# Patient Record
Sex: MALE | Race: WHITE | NOT HISPANIC OR LATINO | Employment: OTHER | URBAN - METROPOLITAN AREA
[De-identification: names, ages, dates, MRNs, and addresses within clinical notes are randomized per-mention and may not be internally consistent; named-entity substitution may affect disease eponyms.]

---

## 2018-01-05 ENCOUNTER — TRANSCRIBE ORDERS (OUTPATIENT)
Dept: ADMINISTRATIVE | Facility: HOSPITAL | Age: 78
End: 2018-01-05

## 2018-01-05 ENCOUNTER — APPOINTMENT (OUTPATIENT)
Dept: RADIOLOGY | Facility: MEDICAL CENTER | Age: 78
End: 2018-01-05
Payer: MEDICARE

## 2018-01-05 ENCOUNTER — GENERIC CONVERSION - ENCOUNTER (OUTPATIENT)
Dept: OTHER | Facility: OTHER | Age: 78
End: 2018-01-05

## 2018-01-05 DIAGNOSIS — S93.401A SPRAIN OF RIGHT ANKLE, UNSPECIFIED LIGAMENT, INITIAL ENCOUNTER: Primary | ICD-10-CM

## 2018-01-05 PROCEDURE — 73600 X-RAY EXAM OF ANKLE: CPT

## 2019-12-23 ENCOUNTER — TRANSCRIBE ORDERS (OUTPATIENT)
Dept: LAB | Facility: CLINIC | Age: 79
End: 2019-12-23

## 2022-03-31 PROBLEM — N18.9 ACUTE KIDNEY INJURY SUPERIMPOSED ON CHRONIC KIDNEY DISEASE (HCC): Status: ACTIVE | Noted: 2021-08-01

## 2022-03-31 PROBLEM — K22.10 ULCERATIVE ESOPHAGITIS: Status: ACTIVE | Noted: 2021-08-11

## 2022-03-31 PROBLEM — Z98.890 HISTORY OF BLADDER SURGERY: Status: ACTIVE | Noted: 2021-08-01

## 2022-03-31 PROBLEM — I63.9 ISCHEMIC STROKE (HCC): Status: ACTIVE | Noted: 2021-11-10

## 2022-03-31 PROBLEM — E78.5 DYSLIPIDEMIA: Status: ACTIVE | Noted: 2021-08-01

## 2022-03-31 PROBLEM — I50.32 CHRONIC DIASTOLIC HEART FAILURE (HCC): Status: ACTIVE | Noted: 2018-08-14

## 2022-03-31 PROBLEM — D68.9 COAGULATION DEFECT, UNSPECIFIED (HCC): Status: ACTIVE | Noted: 2021-12-09

## 2022-03-31 PROBLEM — G60.9 IDIOPATHIC PERIPHERAL NEUROPATHY: Status: ACTIVE | Noted: 2017-06-05

## 2022-03-31 PROBLEM — N17.9 ACUTE KIDNEY INJURY SUPERIMPOSED ON CHRONIC KIDNEY DISEASE (HCC): Status: ACTIVE | Noted: 2021-08-01

## 2022-03-31 PROBLEM — K50.919 CROHN'S DISEASE WITH COMPLICATION (HCC): Status: ACTIVE | Noted: 2021-08-01

## 2022-03-31 PROBLEM — Z11.1 ENCOUNTER FOR SCREENING FOR RESPIRATORY TUBERCULOSIS: Status: ACTIVE | Noted: 2021-12-24

## 2022-03-31 PROBLEM — I48.0 PAROXYSMAL ATRIAL FIBRILLATION (HCC): Status: ACTIVE | Noted: 2017-06-09

## 2022-03-31 PROBLEM — G46.4 CEREBELLAR STROKE SYNDROME: Status: ACTIVE | Noted: 2021-12-16

## 2022-03-31 PROBLEM — K26.9 DUODENAL ULCER: Status: ACTIVE | Noted: 2021-08-16

## 2022-03-31 PROBLEM — K92.2 UPPER GI BLEED: Status: ACTIVE | Noted: 2021-08-16

## 2022-03-31 PROBLEM — Z98.61 S/P PTCA (PERCUTANEOUS TRANSLUMINAL CORONARY ANGIOPLASTY): Status: ACTIVE | Noted: 2017-06-22

## 2022-03-31 PROBLEM — E03.9 HYPOTHYROIDISM, UNSPECIFIED: Status: ACTIVE | Noted: 2022-01-26

## 2022-03-31 PROBLEM — H34.232 RETINAL ARTERY BRANCH OCCLUSION OF LEFT EYE: Status: ACTIVE | Noted: 2017-07-28

## 2022-03-31 PROBLEM — I50.9 HEART FAILURE, UNSPECIFIED (HCC): Status: ACTIVE | Noted: 2021-12-16

## 2022-03-31 PROBLEM — Z98.61 H/O PERCUTANEOUS TRANSLUMINAL CORONARY ANGIOPLASTY: Status: ACTIVE | Noted: 2021-08-01

## 2022-03-31 PROBLEM — N18.6 ESRD (END STAGE RENAL DISEASE) (HCC): Status: ACTIVE | Noted: 2021-11-10

## 2022-03-31 PROBLEM — I24.9 ACS (ACUTE CORONARY SYNDROME) (HCC): Status: ACTIVE | Noted: 2019-01-06

## 2022-03-31 PROBLEM — Z95.5 S/P CORONARY ARTERY STENT PLACEMENT: Status: ACTIVE | Noted: 2021-08-01

## 2022-03-31 PROBLEM — Z87.19 H/O: GI BLEED: Status: ACTIVE | Noted: 2021-11-10

## 2022-03-31 PROBLEM — N28.1 ACQUIRED RENAL CYST: Status: ACTIVE | Noted: 2017-01-18

## 2022-03-31 PROBLEM — D64.9 PROFOUND ANEMIA: Status: ACTIVE | Noted: 2021-08-01

## 2022-03-31 PROBLEM — K92.2 ACUTE UPPER GI BLEED: Status: ACTIVE | Noted: 2021-09-21

## 2022-04-08 ENCOUNTER — TELEPHONE (OUTPATIENT)
Dept: NEPHROLOGY | Facility: CLINIC | Age: 82
End: 2022-04-08

## 2022-04-08 NOTE — TELEPHONE ENCOUNTER
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

## 2022-04-28 ENCOUNTER — OFFICE VISIT (OUTPATIENT)
Dept: FAMILY MEDICINE CLINIC | Facility: CLINIC | Age: 82
End: 2022-04-28
Payer: MEDICARE

## 2022-04-28 ENCOUNTER — TELEPHONE (OUTPATIENT)
Dept: NEPHROLOGY | Facility: CLINIC | Age: 82
End: 2022-04-28

## 2022-04-28 VITALS
BODY MASS INDEX: 24.2 KG/M2 | HEIGHT: 70 IN | OXYGEN SATURATION: 100 % | HEART RATE: 64 BPM | WEIGHT: 169 LBS | DIASTOLIC BLOOD PRESSURE: 62 MMHG | TEMPERATURE: 98.7 F | SYSTOLIC BLOOD PRESSURE: 112 MMHG

## 2022-04-28 DIAGNOSIS — Z00.00 MEDICARE ANNUAL WELLNESS VISIT, SUBSEQUENT: ICD-10-CM

## 2022-04-28 DIAGNOSIS — I24.9 ACS (ACUTE CORONARY SYNDROME) (HCC): ICD-10-CM

## 2022-04-28 DIAGNOSIS — I50.32 CHRONIC DIASTOLIC HEART FAILURE (HCC): ICD-10-CM

## 2022-04-28 DIAGNOSIS — E44.1 MILD PROTEIN-CALORIE MALNUTRITION (HCC): ICD-10-CM

## 2022-04-28 DIAGNOSIS — E03.9 HYPOTHYROIDISM, UNSPECIFIED TYPE: ICD-10-CM

## 2022-04-28 DIAGNOSIS — E11.22 TYPE 2 DIABETES MELLITUS WITH CHRONIC KIDNEY DISEASE ON CHRONIC DIALYSIS, WITHOUT LONG-TERM CURRENT USE OF INSULIN (HCC): ICD-10-CM

## 2022-04-28 DIAGNOSIS — I10 BENIGN ESSENTIAL HYPERTENSION: Primary | ICD-10-CM

## 2022-04-28 DIAGNOSIS — I48.0 PAROXYSMAL ATRIAL FIBRILLATION (HCC): ICD-10-CM

## 2022-04-28 DIAGNOSIS — D68.9 COAGULATION DEFECT, UNSPECIFIED (HCC): ICD-10-CM

## 2022-04-28 DIAGNOSIS — N18.6 TYPE 2 DIABETES MELLITUS WITH CHRONIC KIDNEY DISEASE ON CHRONIC DIALYSIS, WITHOUT LONG-TERM CURRENT USE OF INSULIN (HCC): ICD-10-CM

## 2022-04-28 DIAGNOSIS — I63.9 ISCHEMIC STROKE (HCC): ICD-10-CM

## 2022-04-28 DIAGNOSIS — I50.32 CHRONIC DIASTOLIC CHF (CONGESTIVE HEART FAILURE) (HCC): ICD-10-CM

## 2022-04-28 DIAGNOSIS — N18.6 ESRD (END STAGE RENAL DISEASE) (HCC): ICD-10-CM

## 2022-04-28 DIAGNOSIS — Z99.2 TYPE 2 DIABETES MELLITUS WITH CHRONIC KIDNEY DISEASE ON CHRONIC DIALYSIS, WITHOUT LONG-TERM CURRENT USE OF INSULIN (HCC): ICD-10-CM

## 2022-04-28 DIAGNOSIS — K51.919 ULCERATIVE COLITIS WITH COMPLICATION, UNSPECIFIED LOCATION (HCC): ICD-10-CM

## 2022-04-28 PROCEDURE — G0438 PPPS, INITIAL VISIT: HCPCS | Performed by: FAMILY MEDICINE

## 2022-04-28 PROCEDURE — 1123F ACP DISCUSS/DSCN MKR DOCD: CPT | Performed by: FAMILY MEDICINE

## 2022-04-28 PROCEDURE — 99204 OFFICE O/P NEW MOD 45 MIN: CPT | Performed by: FAMILY MEDICINE

## 2022-04-28 RX ORDER — FUROSEMIDE 20 MG/1
20 TABLET ORAL 2 TIMES DAILY
Status: CANCELLED | OUTPATIENT
Start: 2022-04-28

## 2022-04-28 RX ORDER — AMLODIPINE BESYLATE 5 MG/1
10 TABLET ORAL DAILY
Qty: 30 TABLET | Refills: 5 | Status: SHIPPED | OUTPATIENT
Start: 2022-04-28

## 2022-04-28 RX ORDER — ROSUVASTATIN CALCIUM 10 MG/1
TABLET, COATED ORAL
Qty: 30 TABLET | Refills: 5 | Status: SHIPPED | OUTPATIENT
Start: 2022-04-28

## 2022-04-28 RX ORDER — HYDRALAZINE HYDROCHLORIDE 25 MG/1
TABLET, FILM COATED ORAL
COMMUNITY
Start: 2022-04-25

## 2022-04-28 RX ORDER — TORSEMIDE 20 MG/1
1 TABLET ORAL DAILY
COMMUNITY
Start: 2021-12-10 | End: 2022-04-28 | Stop reason: ALTCHOICE

## 2022-04-28 RX ORDER — FUROSEMIDE 20 MG/1
20 TABLET ORAL 2 TIMES DAILY
Qty: 60 TABLET | Refills: 3 | Status: SHIPPED | OUTPATIENT
Start: 2022-04-28 | End: 2022-07-25

## 2022-04-28 RX ORDER — FUROSEMIDE 20 MG/1
20 TABLET ORAL 2 TIMES DAILY
COMMUNITY
End: 2022-04-28 | Stop reason: SDUPTHER

## 2022-04-28 NOTE — ASSESSMENT & PLAN NOTE
Wt Readings from Last 3 Encounters:   04/28/22 76 7 kg (169 lb)   03/31/22 78 kg (172 lb)       History of CHF patient is euvolemic at this point going to dialysis 3 times per week at school questionable as to the degree of need for his current medications and treatment I will help the patient with his son today seeking 2nd opinions as he is reluctant to continue dialysis and at this point patient should follow-up with Cardiology for his heart as scheduled

## 2022-04-28 NOTE — PROGRESS NOTES
Assessment/Plan:       Problem List Items Addressed This Visit        Endocrine    Hypothyroidism, unspecified     Hypothyroidism stable check TSH T4 unclear as to degree of hypothyroidism patient is not on any medication for this currently         Relevant Orders    TSH, 3rd generation with Free T4 reflex    Type 2 diabetes mellitus with chronic kidney disease on chronic dialysis, without long-term current use of insulin (HCC)       Lab Results   Component Value Date    HGBA1C 5 0 11/11/2021   Unclear as patient is a new patient to me as to whether he truly had diabetes his last A1c was at 5 I do not believe he has diabetes at this time no treatment or medication now I will follow-up with laboratory work including A1c           Relevant Medications    furosemide (LASIX) 20 mg tablet    Other Relevant Orders    Hemoglobin A1C       Cardiovascular and Mediastinum    ACS (acute coronary syndrome) (HCC)    Relevant Medications    amLODIPine (NORVASC) 5 mg tablet    rosuvastatin (CRESTOR) 10 MG tablet    Other Relevant Orders    Ambulatory Referral to Nephrology    Comprehensive metabolic panel    CBC and differential    UA (URINE) with reflex to Scope    Microalbumin / creatinine urine ratio    Lipid panel    Benign essential hypertension - Primary     Stable currently no change in medications         Relevant Medications    hydrALAZINE (APRESOLINE) 25 mg tablet    amLODIPine (NORVASC) 5 mg tablet    rosuvastatin (CRESTOR) 10 MG tablet    furosemide (LASIX) 20 mg tablet    Other Relevant Orders    Ambulatory Referral to Nephrology    Comprehensive metabolic panel    CBC and differential    UA (URINE) with reflex to Scope    Microalbumin / creatinine urine ratio    Lipid panel    Chronic diastolic heart failure (HCC)     Wt Readings from Last 3 Encounters:   04/28/22 76 7 kg (169 lb)   03/31/22 78 kg (172 lb)       History of CHF patient is euvolemic at this point going to dialysis 3 times per week at school questionable as to the degree of need for his current medications and treatment I will help the patient with his son today seeking 2nd opinions as he is reluctant to continue dialysis and at this point patient should follow-up with Cardiology for his heart as scheduled           Relevant Medications    amLODIPine (NORVASC) 5 mg tablet    Ischemic stroke (HCC)    Relevant Medications    amLODIPine (NORVASC) 5 mg tablet    rosuvastatin (CRESTOR) 10 MG tablet    Other Relevant Orders    Ambulatory Referral to Nephrology    Comprehensive metabolic panel    CBC and differential    UA (URINE) with reflex to Scope    Microalbumin / creatinine urine ratio    Lipid panel    Paroxysmal atrial fibrillation (HCC)     Paroxysmal atrial fibrillation by history patient is in sinus mechanism at this time continuing on metoprolol and Eliquis         Relevant Medications    amLODIPine (NORVASC) 5 mg tablet       Hematopoietic and Hemostatic    Coagulation defect, unspecified ([facility] [address] )       Genitourinary    ESRD (end stage renal disease) ([facility] [address] )     Lab Results   Component Value Date    CREATININE 1 50 (H) 12/29/2014    CREATININE 1 30 12/02/2014    CREATININE 1 46 (H) 11/06/2014   I am unclear as to the degree of kidney disease on this patient has he presents here for the 1st time brought in by his son  I have been treating his son in the family for many years and this is the 1st meeting for me with this patient  He was hospitalized at [facility] from November through December and then started on dialysis directly however patient feels weak dizzy and tired and after dialysis treatments and states that he does not want to continue with dialysis and would like a 2nd opinion before having an AV shunt placed  The family states that the dialysis center tells them there is not too much dialyzed with treatments and the patient feels extremely weak for the next 24 hours after treatment    At this time would like him to go for a 2nd opinion to better clarify his medical situation         Relevant Medications    amLODIPine (NORVASC) 5 mg tablet    rosuvastatin (CRESTOR) 10 MG tablet    furosemide (LASIX) 20 mg tablet    Other Relevant Orders    Ambulatory Referral to Nephrology    Comprehensive metabolic panel    CBC and differential    UA (URINE) with reflex to Scope    Microalbumin / creatinine urine ratio    Lipid panel       Other    Mild protein-calorie malnutrition ([facility] [address] )    Medicare annual wellness visit, subsequent      Other Visit Diagnoses     Chronic diastolic CHF (congestive heart failure) (HCC)        Relevant Medications    amLODIPine (NORVASC) 5 mg tablet    furosemide (LASIX) 20 mg tablet    Ulcerative colitis with complication, unspecified location [facility])                Subjective:      Patient ID: Lynnette Sykes is a 79 y o  male  Patient presents to establish care in provide 2nd opinion on overall treatment he is reluctant to continue with dialysis although he is to be scheduled in the future for an AV shunt      The following portions of the patient's history were reviewed and updated as appropriate: allergies, current medications, past family history, past medical history, past social history, past surgical history and problem list     Review of Systems   Constitutional: Negative for chills, fatigue and fever  HENT: Negative for congestion, nosebleeds, rhinorrhea, sinus pressure and sore throat  Eyes: Negative for discharge and redness  Respiratory: Negative for cough and shortness of breath  Cardiovascular: Negative for chest pain, palpitations and leg swelling  Gastrointestinal: Negative for abdominal pain, blood in stool and nausea  Endocrine: Negative for cold intolerance, heat intolerance and polyuria  Genitourinary: Negative for dysuria and frequency          No difficulty urinating patient gets up twice per night and has good urinary flow overall   Musculoskeletal: Negative for arthralgias, back pain and myalgias  Skin: Negative for rash  Neurological: Negative for dizziness, weakness and headaches  Hematological: Negative for adenopathy  Psychiatric/Behavioral: Negative for behavioral problems and sleep disturbance  The patient is not nervous/anxious  Objective:      /62   Pulse 64   Temp 98 7 °F (37 1 °C)   Ht 5' 10" (1 778 m)   Wt 76 7 kg (169 lb)   SpO2 100%   BMI 24 25 kg/m²        Physical Exam  Vitals and nursing note reviewed  Constitutional:       Appearance: Normal appearance  He is well-developed and normal weight  HENT:      Head: Normocephalic and atraumatic  Right Ear: Tympanic membrane and external ear normal       Left Ear: Tympanic membrane and external ear normal       Nose: Nose normal       Mouth/Throat:      Mouth: Mucous membranes are moist       Pharynx: Oropharynx is clear  Eyes:      General: No scleral icterus  Extraocular Movements: Extraocular movements intact  Conjunctiva/sclera: Conjunctivae normal       Pupils: Pupils are equal, round, and reactive to light  Neck:      Thyroid: No thyromegaly  Vascular: No JVD  Cardiovascular:      Rate and Rhythm: Normal rate and regular rhythm  Pulses: Normal pulses  Heart sounds: Normal heart sounds  No murmur heard  Pulmonary:      Effort: Pulmonary effort is normal       Breath sounds: Normal breath sounds  No wheezing or rales  Chest:      Chest wall: No tenderness  Abdominal:      General: Bowel sounds are normal  There is no distension  Palpations: Abdomen is soft  There is no mass  Tenderness: There is no abdominal tenderness  There is no guarding or rebound  Musculoskeletal:         General: No tenderness or deformity  Normal range of motion  Cervical back: Normal range of motion and neck supple  Lymphadenopathy:      Cervical: No cervical adenopathy  Skin:     General: Skin is warm and dry  Findings: No erythema or rash  Neurological:      General: No focal deficit present  Mental Status: He is alert and oriented to person, place, and time  Cranial Nerves: No cranial nerve deficit  Deep Tendon Reflexes: Reflexes are normal and symmetric  Reflexes normal    Psychiatric:         Mood and Affect: Mood normal          Behavior: Behavior normal          Thought Content: Thought content normal          Judgment: Judgment normal           Data:    Laboratory Results: I have personally reviewed the pertinent laboratory results/reports   Radiology/Other Diagnostic Testing Results: I have personally reviewed pertinent reports         Lab Results   Component Value Date    HGB 13 3 05/02/2014    HCT 41 6 05/02/2014     Lab Results   Component Value Date     12/29/2014    K 4 9 12/29/2014     12/29/2014    CO2 29 12/29/2014    ANIONGAP 6 12/29/2014    BUN 32 (H) 12/29/2014    CREATININE 1 50 (H) 12/29/2014    GLUCOSE 124 12/29/2014    CALCIUM 9 6 12/29/2014     No results found for: CHOLESTEROL  No results found for: HDL  No results found for: LDLCALC  No results found for: TRIG  No results found for: CHOLHDL  No results found for: [id number], TSH  Lab Results   Component Value Date    HGBA1C 5 0 11/11/2021     No results found for: PSA    Jordyn Mcclure DO

## 2022-04-28 NOTE — TELEPHONE ENCOUNTER
Patients son Florentino Faulkner called the office advising that patients PCP referred him to our office to be seen by Lei Sands to be consulted for a fistula removal  No transplant has be done to proceed with such request  Patient is followed by our team at the [facility] [address] in [facility]  Can someone please call the son to discuss if the following is possible

## 2022-04-28 NOTE — ASSESSMENT & PLAN NOTE
Lab Results   Component Value Date    HGBA1C 5 0 11/11/2021   Unclear as patient is a new patient to me as to whether he truly had diabetes his last A1c was at 5 I do not believe he has diabetes at this time no treatment or medication now I will follow-up with laboratory work including A1c

## 2022-04-28 NOTE — ASSESSMENT & PLAN NOTE
Paroxysmal atrial fibrillation by history patient is in sinus mechanism at this time continuing on metoprolol and Eliquis

## 2022-04-28 NOTE — ASSESSMENT & PLAN NOTE
Hypothyroidism stable check TSH T4 unclear as to degree of hypothyroidism patient is not on any medication for this currently

## 2022-04-28 NOTE — TELEPHONE ENCOUNTER
Left voicemail explaining that currently patient has PermCath which needs to be present until patient has functioning AV graft which he is scheduled to be placed next month  Advised him to call back to dialysis center or office if any questions

## 2022-04-28 NOTE — PROGRESS NOTES
Assessment and Plan:     Problem List Items Addressed This Visit     None           Preventive health issues were discussed with patient, and age appropriate screening tests were ordered as noted in patient's After Visit Summary  Personalized health advice and appropriate referrals for health education or preventive services given if needed, as noted in patient's After Visit Summary       History of Present Illness:     Patient presents for Medicare Annual Wellness visit    Patient Care Team:  Bernardo Melchor DO as PCP - General (Family Medicine)     Problem List:     Patient Active Problem List   Diagnosis    ACS (acute coronary syndrome) ([facility] [address] [address] )    Acquired renal cyst    Acute kidney injury superimposed on chronic kidney disease [facility]    Benign essential hypertension    Benign prostatic hyperplasia without urinary obstruction    Cerebellar stroke syndrome    Chronic diastolic heart failure ([facility]    Coagulation defect, unspecified ([facility]    Mitral valve disorder    Crohn's disease with complication ([facility]    Duodenal ulcer    Dyslipidemia    Elevated PSA    Encounter for screening for respiratory tuberculosis    ESRD (end stage renal disease) ([facility] )    H/O percutaneous transluminal coronary angioplasty    H/O: GI bleed    Heart failure, unspecified ([facility] )    History of bladder surgery    Hypothyroidism, unspecified    Idiopathic peripheral neuropathy    Upper GI bleed    Ischemic stroke ([facility]    Occlusion and stenosis of carotid artery without mention of cerebral infarction    Osteoarthritis of pelvis    S/P PTCA (percutaneous transluminal coronary angioplasty)    Paroxysmal atrial fibrillation (HCC)    Peptic reflux disease    Profound anemia    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    S/P coronary artery stent placement    Spinal stenosis of lumbar region    Ulcerative esophagitis    Acute upper GI bleed      Past Medical and Surgical History:     No past medical history on file   No past surgical history on file  Family History:     No family history on file  Social History:     Social History     Socioeconomic History    Marital status: Unknown     Spouse name: Not on file    Number of children: Not on file    Years of education: Not on file    Highest education level: Not on file   Occupational History    Occupation: RETIRED/ [employer]   Tobacco Use    Smoking status: Never Smoker    Smokeless tobacco: Never Used   Substance and Sexual Activity    Alcohol use: Never    Drug use: Not on file    Sexual activity: Not on file   Other Topics Concern    Not on file   Social History Narrative    Not on file     Social Determinants of Health     Financial Resource Strain: Not on file   Food Insecurity: Not on file   Transportation Needs: Not on file   Physical Activity: Not on file   Stress: Not on file   Social Connections: Not on file   Intimate Partner Violence: Not on file   Housing Stability: Not on file      Medications and Allergies:     Current Outpatient Medications   Medication Sig Dispense Refill    amLODIPine (NORVASC) 5 mg tablet Take 10 mg by mouth      apixaban (Eliquis) 2 5 mg Take 1 tablet by mouth 2 (two) times a day      Cholecalciferol 50 MCG (2000 UT) CAPS Take 1 tablet by mouth daily      metoprolol succinate (TOPROL-XL) 200 MG 24 hr tablet Take 1 tablet by mouth daily      pantoprazole (PROTONIX) 40 mg tablet Take 40 mg by mouth daily      rosuvastatin (CRESTOR) 40 MG tablet Take 1 tablet by mouth in the morning       No current facility-administered medications for this visit       Allergies   Allergen Reactions    Contrast Dye [Iodinated Diagnostic Agents] Other (See Comments)     NOT SPECIFIED    Penicillins Other (See Comments)     NOT SPECIFIED      Immunizations:     Immunization History   Administered Date(s) Administered    Hepatitis B Vaccine (Recombinant), Cpg Adjuvanted 03/02/2022, 04/06/2022    INFLUENZA 10/14/2004, 10/11/2006, 11/05/2007, 10/17/2008, 10/08/2009, 09/22/2010, 10/17/2011, 10/15/2012, 01/08/2013, 10/29/2013, 01/15/2014, 10/23/2014, 01/23/2015, 09/15/2015, 12/01/2016, 10/12/2017, 09/24/2018, 10/25/2019    Pneumococcal Conjugate 13-Valent 05/13/2015    Pneumococcal Polysaccharide PPV23 11/18/2005      Health Maintenance: There are no preventive care reminders to display for this patient  Topic Date Due    COVID-19 Vaccine (1) Never done    DTaP,Tdap,and Td Vaccines (1 - Tdap) Never done    Influenza Vaccine (1) 09/01/2021      Medicare Health Risk Assessment:     There were no vitals taken for this visit  Mariela Kirkpatrick is here for his Initial Wellness visit  Health Risk Assessment:   Patient rates overall health as fair  Patient feels that their physical health rating is slightly worse  Patient is satisfied with their life  Eyesight was rated as slightly worse  Hearing was rated as same  Patient feels that their emotional and mental health rating is much better  Patients states they are never, rarely angry  Patient states they are sometimes unusually tired/fatigued  Pain experienced in the last 7 days has been none  Patient states that he has experienced weight loss or gain in last 6 months  Depression Screening:   PHQ-2 Score: 0      Fall Risk Screening: In the past year, patient has experienced: no history of falling in past year      Home Safety:  Patient does not have trouble with stairs inside or outside of their home  Patient has working smoke alarms and has working carbon monoxide detector  Home safety hazards include: none  Nutrition:   Current diet is Regular  Medications:   Patient is not currently taking any over-the-counter supplements  Patient is able to manage medications       Activities of Daily Living (ADLs)/Instrumental Activities of Daily Living (IADLs):   Walk and transfer into and out of bed and chair?: Yes  Dress and groom yourself?: Yes    Bathe or shower yourself?: Yes    Feed yourself? Yes  Do your laundry/housekeeping?: Yes  Manage your money, pay your bills and track your expenses?: Yes  Make your own meals?: Yes    Do your own shopping?: Yes    Previous Hospitalizations:   Any hospitalizations or ED visits within the last 12 months?: Yes    How many hospitalizations have you had in the last year?: 1-2    Advance Care Planning:   Living will: Yes    Durable POA for healthcare: Yes    Advanced directive: Yes      PREVENTIVE SCREENINGS      Cardiovascular Screening:    General: Screening Current      Diabetes Screening:     General: Screening Current      Prostate Cancer Screening:    General: Screening Not Indicated      Lung Cancer Screening:     General: Screening Not Indicated    Screening, Brief Intervention, and Referral to Treatment (SBIRT)    Screening  Typical number of drinks in a day: 0  Typical number of drinks in a week: 0  Interpretation: Low risk drinking behavior      Single Item Drug Screening:  How often have you used an illegal drug (including marijuana) or a prescription medication for non-medical reasons in the past year? never    Single Item Drug Screen Score: 0  Interpretation: Negative screen for possible drug use disorder      Teri Richey, DO

## 2022-04-28 NOTE — ASSESSMENT & PLAN NOTE
Lab Results   Component Value Date    CREATININE 1 50 (H) 12/29/2014    CREATININE 1 30 12/02/2014    CREATININE 1 46 (H) 11/06/2014   I am unclear as to the degree of kidney disease on this patient has he presents here for the 1st time brought in by his son  I have been treating his son in the family for many years and this is the 1st meeting for me with this patient  He was hospitalized at [facility] from November through December and then started on dialysis directly however patient feels weak dizzy and tired and after dialysis treatments and states that he does not want to continue with dialysis and would like a 2nd opinion before having an AV shunt placed  The family states that the dialysis center tells them there is not too much dialyzed with treatments and the patient feels extremely weak for the next 24 hours after treatment    At this time would like him to go for a 2nd opinion to better clarify his medical situation

## 2022-06-24 ENCOUNTER — HOSPITAL ENCOUNTER (OUTPATIENT)
Dept: RADIOLOGY | Facility: HOSPITAL | Age: 82
Discharge: HOME | End: 2022-06-24
Attending: STUDENT IN AN ORGANIZED HEALTH CARE EDUCATION/TRAINING PROGRAM
Payer: MEDICARE

## 2022-06-24 ENCOUNTER — TRANSCRIBE ORDERS (OUTPATIENT)
Dept: RADIOLOGY | Facility: HOSPITAL | Age: 82
End: 2022-06-24

## 2022-06-24 ENCOUNTER — PREP FOR PROCEDURE (OUTPATIENT)
Dept: INTERVENTIONAL RADIOLOGY/VASCULAR | Facility: CLINIC | Age: 82
End: 2022-06-24

## 2022-06-24 DIAGNOSIS — N18.9 CHRONIC KIDNEY DISEASE, UNSPECIFIED CKD STAGE: Primary | ICD-10-CM

## 2022-06-24 DIAGNOSIS — N18.6 ESRD (END STAGE RENAL DISEASE) (HCC): ICD-10-CM

## 2022-06-24 DIAGNOSIS — N18.6 ESRD (END STAGE RENAL DISEASE) (HCC): Primary | ICD-10-CM

## 2022-06-24 PROCEDURE — 32561 LYSE CHEST FIBRIN INIT DAY: CPT

## 2022-06-24 PROCEDURE — 36598 INJ W/FLUOR EVAL CV DEVICE: CPT

## 2022-06-24 PROCEDURE — 36593 DECLOT VASCULAR DEVICE: CPT | Performed by: RADIOLOGY

## 2022-06-24 RX ADMIN — ALTEPLASE 2 MG: 2.2 INJECTION, POWDER, LYOPHILIZED, FOR SOLUTION INTRAVENOUS at 14:31

## 2022-06-24 RX ADMIN — IODIXANOL 30 ML: 320 INJECTION, SOLUTION INTRAVASCULAR at 14:38

## 2022-06-24 RX ADMIN — ALTEPLASE 2 MG: 2.2 INJECTION, POWDER, LYOPHILIZED, FOR SOLUTION INTRAVENOUS at 14:30

## 2022-06-24 NOTE — BRIEF OP NOTE (RAD/CATH)
INTERVENTIONAL RADIOLOGY PROCEDURE NOTE    Date: 6/24/2022    Procedure: IR TUNNELED CENTRAL LINE CHECK/CHANGE/REPOSITION    Preoperative diagnosis:   1  ESRD (end stage renal disease) ([facility] [address] )         Postoperative diagnosis: Same  Surgeon: Hollie Agudelo MD     Assistant: None  No qualified resident was available  Blood loss: none    Specimens: none     Findings: Catheter a little short with fibrin sheath affecting the proximal port  Will start with tPA and exchange catheter if there is on improvement  Complications: None immediate      Anesthesia: none

## 2022-07-24 DIAGNOSIS — I50.32 CHRONIC DIASTOLIC CHF (CONGESTIVE HEART FAILURE) (HCC): ICD-10-CM

## 2022-07-25 RX ORDER — FUROSEMIDE 20 MG/1
TABLET ORAL
Qty: 180 TABLET | Refills: 1 | Status: SHIPPED | OUTPATIENT
Start: 2022-07-25

## 2022-10-11 PROBLEM — Z00.00 MEDICARE ANNUAL WELLNESS VISIT, SUBSEQUENT: Status: RESOLVED | Noted: 2022-04-28 | Resolved: 2022-10-11

## 2022-10-11 PROBLEM — Z11.1 ENCOUNTER FOR SCREENING FOR RESPIRATORY TUBERCULOSIS: Status: RESOLVED | Noted: 2021-12-24 | Resolved: 2022-10-11
